# Patient Record
Sex: FEMALE | Race: WHITE
[De-identification: names, ages, dates, MRNs, and addresses within clinical notes are randomized per-mention and may not be internally consistent; named-entity substitution may affect disease eponyms.]

---

## 2019-01-28 ENCOUNTER — HOSPITAL ENCOUNTER (INPATIENT)
Dept: HOSPITAL 97 - 2ND-WC | Age: 34
LOS: 5 days | Discharge: HOME | End: 2019-02-02
Attending: OBSTETRICS & GYNECOLOGY | Admitting: SPECIALIST
Payer: SELF-PAY

## 2019-01-28 VITALS — BODY MASS INDEX: 25.8 KG/M2

## 2019-01-28 DIAGNOSIS — Z3A.40: ICD-10-CM

## 2019-01-28 PROCEDURE — 36415 COLL VENOUS BLD VENIPUNCTURE: CPT

## 2019-01-28 PROCEDURE — 85014 HEMATOCRIT: CPT

## 2019-01-28 PROCEDURE — 85025 COMPLETE CBC W/AUTO DIFF WBC: CPT

## 2019-01-28 PROCEDURE — 90715 TDAP VACCINE 7 YRS/> IM: CPT

## 2019-01-28 PROCEDURE — 86592 SYPHILIS TEST NON-TREP QUAL: CPT

## 2019-01-28 PROCEDURE — 86901 BLOOD TYPING SEROLOGIC RH(D): CPT

## 2019-01-28 PROCEDURE — 87340 HEPATITIS B SURFACE AG IA: CPT

## 2019-02-01 LAB
HCT VFR BLD CALC: 36.9 % (ref 36–45)
LYMPHOCYTES # SPEC AUTO: 1.6 K/UL (ref 0.7–4.9)
PMV BLD: 9 FL (ref 7.6–11.3)
RBC # BLD: 4.09 M/UL (ref 3.86–4.86)

## 2019-02-01 PROCEDURE — 0KQM0ZZ REPAIR PERINEUM MUSCLE, OPEN APPROACH: ICD-10-PCS

## 2019-02-01 RX ADMIN — OXYCODONE AND ACETAMINOPHEN PRN TAB: 5; 325 TABLET ORAL at 20:08

## 2019-02-01 RX ADMIN — OXYCODONE AND ACETAMINOPHEN PRN TAB: 5; 325 TABLET ORAL at 11:55

## 2019-02-01 NOTE — OP
Surgeon:  Hardy Cuba MD



Ms. Tran is a 33-year-old, ,  female,  5, para 2-1-1-3, at 40+ weeks gestation
, admitted with spontaneous rupture of membranes and prodromal labor, noted to be approximately 3 cm 
dilated.  After 5 hours of observation, she had only progressed to approximately 4+ to 5 cm and was h
aving very irregular contractions.  Because of this, Pitocin augmentation was begun.  She went rapidl
y over the next half hour to 45 minutes to a spontaneous controlled nonsterile delivery of a 7-pound 
14-ounce male infant, after delayed cord clamping, the cord was clamped, cut, and the infant placed o
n mother's upper abdomen.  Cord blood was obtained.  Placenta was spontaneously expelled and appeared
 to be intact.  Intrauterine examination revealed no retained placental fragments.  The patient suffe
red a midline second-degree perineal laceration at the time of delivery.  This was repaired utilizing
 local infiltration with 1% Xylocaine and 3-0 Vicryl suture in the usual fashion.  Estimated total bl
ood loss was approximately 250 cc.  Apgars were 9 and 9.





ODILON/EDDY

DD:  2019 08:06:58Voice ID:  [id number]

DT:  2019 11:48:23Report ID:  [id number]

## 2019-02-01 NOTE — P.BOP
Preoperative diagnosis: 40+ pregnancy, SPROM


Postoperative diagnosis: Same, delivery viable male infant, non-sterile, 

controlled


Primary procedure: SCVD


Secondary procedure: Repair, midline second degree laceration


Estimated blood loss: 250ml


Anesthesia: Local (for repair of laceration)


Complications: Other (non-sterile delivery)


Transferred to: Other (274)


Condition: Good

## 2019-02-01 NOTE — PREOPHP
Date of Admission:  2019



History Of Present Illness:  Ms. Tran is a 33-year-old   female,  5, para 2-1-
0-3 with now 40+ weeks gestation.  She has been followed by Dr. Stone during this pregnancy without a
pparent problems.  She noted leaking of fluid began approximately 10 p.m. this evening and presents t
o Labor and Delivery for evaluation with mild contractions.



Past Medical History:  Please see prenatal record.



Family History:  Please see prenatal record.



Review of Systems:

She reports no recent cough, cold, fever, or chills.  No recent nausea or vomiting.  She denies any b
reast lumps.  Baby has been active.  She denies any urine symptoms.  She denies any recurring bowel p
roblems.



Physical Examination:

General:  Reveals a pleasant  female, in no apparent distress. 

Neck:  Supple without adenopathy or thyromegaly. 

Lungs:  Clear. 

Cardiac:  Regular rate and rhythm without murmurs. 

Breasts:  Not examined. 

Abdomen:  Nontender with mildly irregular contractions, perhaps every 5 minutes.  Estimated fetal sofiya
ght 6+ to 7+ pounds. 

Pelvic:  Nursing exam, the patient is 3+ cm dilated, completely effaced, at 0 station. 

Extremities:  No cyanosis, clubbing, or edema.



Impression:  40+ week pregnancy, spontaneous rupture of membrane, not in active labor.



Plan:  We will observe for the next couple of hours.  If more regular labor pattern does not ensue, w
e will augment with Pitocin.





ODILON/EDDY

DD:  2019 01:57:10Voice ID:  [id number]

## 2019-02-02 VITALS — SYSTOLIC BLOOD PRESSURE: 119 MMHG | TEMPERATURE: 97.7 F | DIASTOLIC BLOOD PRESSURE: 76 MMHG

## 2019-02-02 LAB — RPR SER QL: (no result)

## 2019-02-02 RX ADMIN — OXYCODONE AND ACETAMINOPHEN PRN TAB: 5; 325 TABLET ORAL at 01:17

## 2019-02-02 RX ADMIN — OXYCODONE AND ACETAMINOPHEN PRN TAB: 5; 325 TABLET ORAL at 06:55

## 2019-02-02 NOTE — DS
Hospital Discharge Diagnosis:  40+ week pregnancy, delivered.



Complications:  Second-degree midline perineal laceration.



Procedures:  Spontaneous controlled vaginal delivery of viable male infant, repair of second-degree p
erineal laceration.



Hospital Course:  The patient is a 33-year-old   female,  5, para 2-1-1-3 at 4
0+ weeks gestation, admitted in prodromal labor with spontaneous rupture of membrane.  She had an une
ventful labor and delivery of a 7-pound 14-ounce male infant, Apgar 9 and 9.  She was dismissed on  first postpartum day, ambulatory, on a select diet with routine post vaginal delivery activity rest
rictions, to be seen back by Dr. Stone for postpartum care.  She is Rh positive blood type, rubella i
mmune, was dismissed with a prescription for Tylenol No. 3, #15.  Lab work included an admission hemo
globin and hematocrit of 12.9 and 36.9.  Dismissal hematocrit of 35.0.  She is again Rh positive bloo
d type.





ODILON/EDDY

DD:  2019 07:23:28Voice ID:  [id number]

DT:  2019 08:13:54Report ID:  [id number]